# Patient Record
Sex: MALE | HISPANIC OR LATINO | Employment: FULL TIME | URBAN - METROPOLITAN AREA
[De-identification: names, ages, dates, MRNs, and addresses within clinical notes are randomized per-mention and may not be internally consistent; named-entity substitution may affect disease eponyms.]

---

## 2022-08-22 ENCOUNTER — HOSPITAL ENCOUNTER (EMERGENCY)
Facility: CLINIC | Age: 52
Discharge: HOME | End: 2022-08-22
Attending: EMERGENCY MEDICINE | Admitting: EMERGENCY MEDICINE

## 2022-08-22 ENCOUNTER — TELEPHONE (OUTPATIENT)
Dept: INTERNAL MEDICINE | Facility: CLINIC | Age: 52
End: 2022-08-22

## 2022-08-22 VITALS
OXYGEN SATURATION: 100 % | SYSTOLIC BLOOD PRESSURE: 115 MMHG | TEMPERATURE: 98.2 F | RESPIRATION RATE: 16 BRPM | DIASTOLIC BLOOD PRESSURE: 85 MMHG | HEART RATE: 78 BPM

## 2022-08-22 DIAGNOSIS — H60.392 INFECTIVE OTITIS EXTERNA, LEFT: ICD-10-CM

## 2022-08-22 PROCEDURE — 99283 EMERGENCY DEPT VISIT LOW MDM: CPT

## 2022-08-22 RX ORDER — CIPROFLOXACIN 500 MG/1
500 TABLET, FILM COATED ORAL 2 TIMES DAILY
Qty: 14 TABLET | Refills: 0 | Status: SHIPPED | OUTPATIENT
Start: 2022-08-22 | End: 2022-08-29

## 2022-08-22 ASSESSMENT — ENCOUNTER SYMPTOMS
RHINORRHEA: 0
SORE THROAT: 0
COUGH: 0
FEVER: 0

## 2022-08-22 ASSESSMENT — ACTIVITIES OF DAILY LIVING (ADL): ADLS_ACUITY_SCORE: 33

## 2022-08-22 NOTE — ED TRIAGE NOTES
Patient presents with complaints of left ear pain for the past 4 days. Patient was seen at the clinic on Sunday for ear pain and was started on Ofloxacin, but pain has not improved. ABC intact without need for intervention at this time.        Triage Assessment     Row Name 08/22/22 0801       Triage Assessment (Adult)    Airway WDL WDL       Respiratory WDL    Respiratory WDL WDL       Skin Circulation/Temperature WDL    Skin Circulation/Temperature WDL WDL       Cardiac WDL    Cardiac WDL WDL       Peripheral/Neurovascular WDL    Peripheral Neurovascular WDL WDL       Cognitive/Neuro/Behavioral WDL    Cognitive/Neuro/Behavioral WDL WDL

## 2022-08-22 NOTE — TELEPHONE ENCOUNTER
Reason for Call:  Other appointment    Detailed comments: Outbound call to schedule an ED follow up visit, per referral recommended follow up within 3-5 days. Please contact to discuss and advise availability for this week and [place].    Phone Number Patient can be reached at: Home number on file [phone] (home)    Best Time: Any    Can we leave a detailed message on this number? YES    Call taken on 8/22/2022 at 9:11 AM by Carmen Oconnell

## 2022-08-22 NOTE — ED PROVIDER NOTES
History   Chief Complaint:  Otalgia       The history is provided by the patient. A  was used (Uzbek).      Rene James is a 52 year old male who presents with left ear pain. The patient states that he has been experiencing this pain for the past 4 days causing tinnitus, hearing loss and jaw pain. The patient states that he originally had a sore throat that he believed was Covid but was not that then radiated into his left ear causing the hearing loss and tinnitus which prompted him to visit [facility] two days ago. There he was prescribed Ofloxacin for the pain which he has been taking and mixing in Tylenol as well. 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 He denies any recent swimming or history of diabetes or hypertension. He denies any fever, congestion, ear discharge, sore throat, rhinorrhea or cough.          Review of Systems   Constitutional: Negative for fever.   HENT: Positive for ear pain (left), hearing loss (left only) and tinnitus. Negative for congestion, ear discharge, rhinorrhea and sore throat.    Respiratory: Negative for cough.    All other systems reviewed and are negative.      Allergies:  The patient has no known allergies.     Medications:  The patient denies any routine medications    Past Medical History:     The patient denies past medical history including.      Past Surgical History:    The patient denies past surgical history.      Family History:    The patient denies past family history.     Social History:  Patient came from home.  Patient is accompanied in the ED with wife.  PCP: No primary care provider on file.     Physical Exam     Patient Vitals for the past 24 hrs:   BP Temp Pulse Resp SpO2   08/22/22 0802 115/85 98.2  F (36.8  C) 78 16 100 %       Physical Exam  General: the patient is awake and interactive  HEENT:  Moist mucous membranes,  conjunctiva normal; left EAC edematous occluding 100% of ear canal, pain with movement of pinna/tragus; unable to visualize left EAC 2/2 edema. No mastoid tenderness.  Right EAC normal, right TM pearly grey.  No posterior oropharyngeal erythema. No trismus.    Pulmonary:  Normal respiratory effort  Cardiovascular:  Well perfused  Musculoskeletal:  Moving 4 extremities grossly wnl, no deformities  Neuro:  Speech normal, no focal deficits  Psych:  Normal affect     Emergency Department Course     Emergency Department Course:       Reviewed:  I reviewed nursing notes, vitals and past medical history    Assessments:  0835 I obtained history and examined the patient as noted above.     Disposition:  The patient was discharged to home.     Impression & Plan     CMS Diagnoses: None    Medical Decision Making:  Rene James is a 52 year old male who presents for evaluation of otalgia on the left side.  The patient has an exam consistent with otitis externa.  Differential considered in this patient with otalgia included mastoiditis, meningitis, perforation, cerumen impaction, mass, dental abscess, or peritonsillar abscess, referred pain, otitis externa, etc.  No signs of mastoiditis, meningitis, pharyngitis or deep space neck infection.  Unable to visualize TM on the left, EAC is very edematous.  Ear wick was placed; he has prescription for Ofloxacin which was used to expand the ear wick.  I will also start him on oral ciprofloxacin as well.  I do not feel imaging is indicated at this time.  He may take Tylenol or Ibuprofen for pain.   Return if increasing pain, fever, decrease in hearing or ear discharge.  Follow-up with primary physician in 7-10 days, if symptoms persist.  He is comfortable with this plan.  All questions answered prior to discharge.    Diagnosis:    ICD-10-CM    1. Infective otitis externa, left  H60.392 Primary Care Referral       Discharge Medications:  New Prescriptions    CIPROFLOXACIN (CIPRO) 500  MG TABLET    Take 1 tablet (500 mg) by mouth 2 times daily for 7 days       Scribe Disclosure:  I, Adam Tran, am serving as a scribe at 8:05 AM on 8/22/2022 to document services personally performed by Bowen Edmonds MD based on my observations and the provider's statements to me.            Bowen Edmonds MD  08/22/22 1032

## 2022-08-22 NOTE — TELEPHONE ENCOUNTER
Per [place], offer Virtual Visit time with Nicole on 8/24 for in-person appointment. Called patient with , appointment scheduled.   Lorin Huntley RN  [facility]

## 2022-08-22 NOTE — TELEPHONE ENCOUNTER
No appointments available in clinic. Primary Care referral placed in ER for patient to establish care. Reached out to clinic manager for direction.     Lorin Huntley RN  [facility]